# Patient Record
Sex: FEMALE | Race: WHITE | Employment: UNEMPLOYED | URBAN - METROPOLITAN AREA
[De-identification: names, ages, dates, MRNs, and addresses within clinical notes are randomized per-mention and may not be internally consistent; named-entity substitution may affect disease eponyms.]

---
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
## 2022-05-20 ENCOUNTER — OFFICE VISIT (OUTPATIENT)
Dept: GASTROENTEROLOGY | Age: 22
End: 2022-05-20
Payer: COMMERCIAL

## 2022-05-20 VITALS
SYSTOLIC BLOOD PRESSURE: 130 MMHG | BODY MASS INDEX: 36.67 KG/M2 | TEMPERATURE: 97.3 F | WEIGHT: 207 LBS | DIASTOLIC BLOOD PRESSURE: 82 MMHG

## 2022-05-20 DIAGNOSIS — F12.90 MARIJUANA USE: ICD-10-CM

## 2022-05-20 DIAGNOSIS — R10.13 ABDOMINAL PAIN, EPIGASTRIC: ICD-10-CM

## 2022-05-20 DIAGNOSIS — K58.9 IRRITABLE BOWEL SYNDROME WITHOUT DIARRHEA: ICD-10-CM

## 2022-05-20 DIAGNOSIS — R11.14 BILIOUS VOMITING WITH NAUSEA: Primary | ICD-10-CM

## 2022-05-20 DIAGNOSIS — R10.84 ABDOMINAL PAIN, GENERALIZED: ICD-10-CM

## 2022-05-20 DIAGNOSIS — R14.2 BURPING: ICD-10-CM

## 2022-05-20 PROCEDURE — 99204 OFFICE O/P NEW MOD 45 MIN: CPT | Performed by: INTERNAL MEDICINE

## 2022-05-20 ASSESSMENT — ENCOUNTER SYMPTOMS
ANAL BLEEDING: 0
SHORTNESS OF BREATH: 0
CHOKING: 0
ABDOMINAL DISTENTION: 1
VOMITING: 1
DIARRHEA: 1
NAUSEA: 1
TROUBLE SWALLOWING: 0
COUGH: 0
VOICE CHANGE: 0
WHEEZING: 0
CONSTIPATION: 0
RECTAL PAIN: 0
BLOOD IN STOOL: 0
SORE THROAT: 0
ABDOMINAL PAIN: 0

## 2022-05-20 NOTE — PROGRESS NOTES
GI NEW PATIENT OFFICE VISIT    INTERVAL HISTORY:   Alejandro Kim, APRN - CNP  [address]  [facility],  [address] [place] [address]    Chief Complaint   Patient presents with    Gastroesophageal Reflux     Pt is here today as a NP for GERD w/o esophagitis & Abd pain. 1. Bilious vomiting with nausea    2. Abdominal pain, generalized    3. Irritable bowel syndrome without diarrhea    4. Abdominal pain, epigastric    5. Burping    6. Marijuana use      This patient is evaluated in my office for the first time  She has been having a lot of GI issues related to some nausea symptoms abdominal pain bloating gas cramping burping    Denies any hematemesis denies any coffee-ground emesis    She also has some vomiting of bilious material    Admits to using marijuana off-and-on basis    Patient has been complaining of some abdominal pains, off and on cramping  Also complains of abdominal bloating and gas  Has off and on nausea without any sig vomiting  Has some alternating constipation and diarrhea  Has no weight loss  Has some anxiety issues    Denies any rectal bleeding denies any melanotic stools    Denies any alcohol abuse    No known family history of colon cancer ulcerative colitis or Crohn's disease    HISTORY OF PRESENT ILLNESS: Antonino Leung is a 25 y.o. female with a past history remarkable for , referred for evaluation of   Chief Complaint   Patient presents with    Gastroesophageal Reflux     Pt is here today as a NP for GERD w/o esophagitis & Abd pain. .    Past Medical,Family, and Social History reviewed and does contribute to the patient presenting condition. Patient's PMH/PSH,SH,PSYCH Hx, MEDs, ALLERGIES, and ROS were all reviewed and updated in the appropriate sections. PAST MEDICAL HISTORY:  Past Medical History:   Diagnosis Date    Anxiety     Depression        No past surgical history on file.     CURRENT MEDICATIONS:    Current Outpatient Medications:     vilazodone HCl (VILAZODONE HCL) 20 MG TABS, Take 20 mg by mouth daily, Disp: , Rfl:     DULoxetine (CYMBALTA) 60 MG extended release capsule, , Disp: , Rfl:     ALLERGIES:   Allergies   Allergen Reactions    Other      Unknown antidepressant, sts starts w a \"z\"    Wellbutrin [Bupropion]        FAMILY HISTORY: No family history on file. SOCIAL HISTORY:   Social History     Socioeconomic History    Marital status: Single     Spouse name: Not on file    Number of children: Not on file    Years of education: Not on file    Highest education level: Not on file   Occupational History    Not on file   Tobacco Use    Smoking status: Never Smoker    Smokeless tobacco: Never Used   Vaping Use    Vaping Use: Never used   Substance and Sexual Activity    Alcohol use: Not Currently    Drug use: Never    Sexual activity: Not on file   Other Topics Concern    Not on file   Social History Narrative    ** Merged History Encounter **          Social Determinants of Health     Financial Resource Strain:     Difficulty of Paying Living Expenses: Not on file   Food Insecurity:     Worried About Running Out of Food in the Last Year: Not on file    Kacie of Food in the Last Year: Not on file   Transportation Needs:     Lack of Transportation (Medical): Not on file    Lack of Transportation (Non-Medical):  Not on file   Physical Activity:     Days of Exercise per Week: Not on file    Minutes of Exercise per Session: Not on file   Stress:     Feeling of Stress : Not on file   Social Connections:     Frequency of Communication with Friends and Family: Not on file    Frequency of Social Gatherings with Friends and Family: Not on file    Attends Taoism Services: Not on file    Active Member of Clubs or Organizations: Not on file    Attends Club or Organization Meetings: Not on file    Marital Status: Not on file   Intimate Partner Violence:     Fear of Current or Ex-Partner: Not on file    Emotionally Abused: Not on file   Carmen See Physically Abused: Not on file    Sexually Abused: Not on file   Housing Stability:     Unable to Pay for Housing in the Last Year: Not on file    Number of Places Lived in the Last Year: Not on file    Unstable Housing in the Last Year: Not on file         REVIEW OF SYSTEMS:         Review of Systems   Constitutional: Negative for appetite change, fatigue and unexpected weight change. HENT: Negative for sore throat, trouble swallowing and voice change. Respiratory: Negative for cough, choking, shortness of breath and wheezing. Cardiovascular: Negative for chest pain, palpitations and leg swelling. Gastrointestinal: Positive for abdominal distention, diarrhea, nausea and vomiting. Negative for abdominal pain, anal bleeding, blood in stool, constipation and rectal pain. Neurological: Negative for dizziness, weakness, light-headedness, numbness and headaches. Hematological: Does not bruise/bleed easily. Psychiatric/Behavioral: Negative for confusion and sleep disturbance. The patient is not nervous/anxious. PHYSICAL EXAMINATION: Vital signs reviewed per the nursing documentation. /82   Temp 97.3 °F (36.3 °C)   Wt 207 lb (93.9 kg)   BMI 36.67 kg/m²   Body mass index is 36.67 kg/m². Physical Exam  Nursing note reviewed. Constitutional:       Appearance: She is well-developed. Comments: Anxious   HENT:      Head: Normocephalic and atraumatic. Eyes:      Conjunctiva/sclera: Conjunctivae normal.      Pupils: Pupils are equal, round, and reactive to light. Cardiovascular:      Heart sounds: Normal heart sounds. Pulmonary:      Effort: Pulmonary effort is normal.      Breath sounds: Normal breath sounds. Abdominal:      General: Bowel sounds are normal.      Palpations: Abdomen is soft. Comments: NON TENDER, NON DISTENTED  LIVER SPLEEN AND HERNIAS ARE NOT  PALPABLE  BOWEL SOUNDS ARE POSITIVE      Musculoskeletal:         General: Normal range of motion. Cervical back: Normal range of motion and neck supple. Skin:     General: Skin is warm. Comments: Tattoos   Neurological:      Mental Status: She is alert and oriented to person, place, and time. Psychiatric:         Behavior: Behavior normal.           LABORATORY DATA: Reviewed  Lab Results   Component Value Date    WBC 10.5 08/25/2020    HGB 13.2 08/25/2020    HCT 39.7 08/25/2020    MCV 83.6 08/25/2020     08/25/2020     08/25/2020    K 3.8 08/25/2020     08/25/2020    CO2 25 08/25/2020    BUN 15 08/25/2020    CREATININE 0.70 08/25/2020    LABALBU 4.5 09/09/2014    BILITOT 0.5 09/09/2014    ALKPHOS 91 09/09/2014    AST 16 09/09/2014    ALT 20 09/09/2014         Lab Results   Component Value Date    RBC 4.75 08/25/2020    HGB 13.2 08/25/2020    MCV 83.6 08/25/2020    MCH 27.8 08/25/2020    MCHC 33.2 08/25/2020    RDW 12.6 08/25/2020    MPV 10.2 08/25/2020    BASOPCT 1 08/25/2020    LYMPHSABS 1.91 08/25/2020    MONOSABS 0.66 08/25/2020    NEUTROABS 7.66 08/25/2020    EOSABS 0.18 08/25/2020    BASOSABS 0.07 08/25/2020         DIAGNOSTIC TESTING:     No results found. Assessment  1. Bilious vomiting with nausea    2. Abdominal pain, generalized    3. Irritable bowel syndrome without diarrhea    4. Abdominal pain, epigastric    5. Burping    6. Marijuana use        Plan    We will plan upper endoscopy rule out any evidence of ulcerations lesions esophagitis or H. Pylori    May be related to marijuana use? Pt was advised in detail about some life style and dietary modifications. She was advised about avoidance of caffeine, nicotine and chocolate. Pt was also told to stay away from any kind of fast foods, soda pops. She was also advised to avoid lots of spices, grease and fried food etc.     Instructions were also given about trying to arrange the timing, quality and quantity of food.     Instructions were given about using ample amount of fiber including dietary and supplemental fiber either metamucil, bennafiber or citrucell etc.  Pt was advised about drinking ample amount of water without any colors or chemicals. Stress was given about regular exercise. Pt has verbalized understanding and agreement to these modifications. Pt seems to have signs and symptoms consistent with GERD, acid indigestion and heartburns. She was discussed  in detail about some possible life style and dietary modifications. She was stressed about the maintenance  of appropriate weight and effect of obesity contributing to reflux symptoms. Routine exercise was streesed. Avoidance of Caffeine, nicotine and chocolate were explained. Pt was asked to avoid spices grease and fried food. Advices were also given about avoidance of any kind of fast foods, soda pops and high energy drinks. Pt was advised to place two small block under the head end of the bed which may help with night time reflux. Was advised not to eat any thin at least 2-3 hrs before going to bed and walk especially after dinner    Pt has verbalized understanding and agreement to this plan. The patient was instructed to start taking some OTC Probiotics products   These are available over the counter at the Pharmacy stores and Grocery stores  He was explained about the beneficial effects they have in the GI track  They will help to establish the good bacterial robyn and will help with the digestion and bowel movements  The patient has verbalized understanding and agreement to this plan      Some literature was given from my office for her to read    More than half of patient's clinic visit time was spent in counseling about lifestyle and dietary modifications  Patient's  questions were answered in this regard as well  The patient has verbalized understanding and agreement       Thank you for allowing me to participate in the care of Ms. Caballero. For any further questions please do not hesitate to contact me.     I have reviewed and agree with the

## 2022-07-20 ENCOUNTER — TELEPHONE (OUTPATIENT)
Dept: GASTROENTEROLOGY | Age: 22
End: 2022-07-20

## 2022-07-21 NOTE — TELEPHONE ENCOUNTER
Patient NO SHOWED her procedure. PER DR GUILLEN Do Not Reschedule; Patient needs to be dismissed from the practice. Writer will notify the . Writer called and lvm informing patient of this and requesting call back if she had any questions.